# Patient Record
Sex: FEMALE | Race: WHITE | Employment: UNEMPLOYED | URBAN - METROPOLITAN AREA
[De-identification: names, ages, dates, MRNs, and addresses within clinical notes are randomized per-mention and may not be internally consistent; named-entity substitution may affect disease eponyms.]

---

## 2017-03-13 PROCEDURE — 36415 COLL VENOUS BLD VENIPUNCTURE: CPT | Performed by: FAMILY MEDICINE

## 2017-03-13 PROCEDURE — 86803 HEPATITIS C AB TEST: CPT | Performed by: FAMILY MEDICINE

## 2017-03-13 PROCEDURE — 87624 HPV HI-RISK TYP POOLED RSLT: CPT | Performed by: FAMILY MEDICINE

## 2017-03-13 PROCEDURE — 88175 CYTOPATH C/V AUTO FLUID REDO: CPT | Performed by: FAMILY MEDICINE

## 2018-04-02 ENCOUNTER — OFFICE VISIT (OUTPATIENT)
Dept: FAMILY MEDICINE CLINIC | Facility: CLINIC | Age: 63
End: 2018-04-02

## 2018-04-02 VITALS
OXYGEN SATURATION: 98 % | HEART RATE: 82 BPM | WEIGHT: 162 LBS | DIASTOLIC BLOOD PRESSURE: 72 MMHG | SYSTOLIC BLOOD PRESSURE: 128 MMHG | HEIGHT: 63 IN | TEMPERATURE: 99 F | BODY MASS INDEX: 28.7 KG/M2

## 2018-04-02 DIAGNOSIS — J01.40 ACUTE PANSINUSITIS, RECURRENCE NOT SPECIFIED: Primary | ICD-10-CM

## 2018-04-02 DIAGNOSIS — J34.89 NASAL SORE: ICD-10-CM

## 2018-04-02 PROCEDURE — 99213 OFFICE O/P EST LOW 20 MIN: CPT | Performed by: NURSE PRACTITIONER

## 2018-04-02 RX ORDER — BENZONATATE 200 MG/1
200 CAPSULE ORAL 3 TIMES DAILY PRN
Qty: 20 CAPSULE | Refills: 0 | Status: SHIPPED | OUTPATIENT
Start: 2018-04-02 | End: 2020-11-09

## 2018-04-02 RX ORDER — AMOXICILLIN AND CLAVULANATE POTASSIUM 875; 125 MG/1; MG/1
1 TABLET, FILM COATED ORAL 2 TIMES DAILY
Qty: 20 TABLET | Refills: 0 | Status: SHIPPED | OUTPATIENT
Start: 2018-04-02 | End: 2018-04-12

## 2018-04-02 NOTE — PROGRESS NOTES
CHIEF COMPLAINT:   Patient presents with:  Cough: Mucinex--x9 days, sinus congestion, cough, headache, facial pressure      HPI:   Raz Tony is a 63year old female who presents for sinus congestion and cough for  9  days.  Symptoms have been worseni • Heart Surgery Mother      valve repair   • Cancer Paternal Grandmother 80     SKIN CA   • Heart Surgery Brother      Stents   • Heart Disease Brother    • Heart Disorder Brother      MVP repair      Smoking status: Former Smoker ASSESSMENT: Acute pansinusitis, recurrence not specified  (primary encounter diagnosis)  Nasal sore    PLAN: Reports has taken Augmentin before with out problems and this has worked well for her.   Offered steroids as patient cough is very tigh and wheezing · Drink plenty of water, hot tea, and other liquids. This may help thin mucus. It also may promote sinus drainage. · Heat may help soothe painful areas of the face. Use a towel soaked in hot water.  Or,  the shower and direct the hot spray onto you · Unusual drowsiness or confusion  · Swelling of the forehead or eyelids  · Vision problems, including blurred or double vision  · Fever of 100.4ºF (38ºC) or higher, or as directed by your healthcare provider  · Seizure  · Breathing problems  · Symptoms no

## 2021-08-22 ENCOUNTER — OFFICE VISIT (OUTPATIENT)
Dept: FAMILY MEDICINE CLINIC | Facility: CLINIC | Age: 66
End: 2021-08-22
Payer: COMMERCIAL

## 2021-08-22 VITALS
HEIGHT: 63 IN | BODY MASS INDEX: 30.65 KG/M2 | WEIGHT: 173 LBS | TEMPERATURE: 98 F | RESPIRATION RATE: 16 BRPM | DIASTOLIC BLOOD PRESSURE: 72 MMHG | OXYGEN SATURATION: 97 % | SYSTOLIC BLOOD PRESSURE: 122 MMHG | HEART RATE: 72 BPM

## 2021-08-22 DIAGNOSIS — Z20.822 ENCOUNTER FOR LABORATORY TESTING FOR COVID-19 VIRUS: Primary | ICD-10-CM

## 2021-08-22 PROCEDURE — 3074F SYST BP LT 130 MM HG: CPT | Performed by: NURSE PRACTITIONER

## 2021-08-22 PROCEDURE — 3078F DIAST BP <80 MM HG: CPT | Performed by: NURSE PRACTITIONER

## 2021-08-22 PROCEDURE — 99202 OFFICE O/P NEW SF 15 MIN: CPT | Performed by: NURSE PRACTITIONER

## 2021-08-22 PROCEDURE — 3008F BODY MASS INDEX DOCD: CPT | Performed by: NURSE PRACTITIONER

## 2021-08-22 NOTE — PROGRESS NOTES
CHIEF COMPLAINT:   Patient presents with:  Covid: traveling      HPI:   [place] Child is a 63year old female who presents for Covid 19 testing. Pt denies COVID exposure. At this time, not experiencing any COVID symptoms.   Would like to be seen and ha encounter diagnosis)    PLAN:   COVID test sent. Advised pt we will notify her of results. There are no Patient Instructions on file for this visit. The patient indicates understanding of these issues and agrees to the plan.

## 2021-08-24 LAB — SARS-COV-2 RNA RESP QL NAA+PROBE: NOT DETECTED

## 2021-11-22 PROBLEM — N95.0 PMB (POSTMENOPAUSAL BLEEDING): Status: ACTIVE | Noted: 2021-11-18

## 2022-11-07 ENCOUNTER — EXTERNAL LAB (OUTPATIENT)
Dept: PEDIATRIC ENDOCRINOLOGY | Age: 67
End: 2022-11-07

## 2022-11-07 LAB
LAB RESULT: NORMAL
T4 FREE SERPL-MCNC: 1.5 NG/DL (ref 0.8–1.8)
TSH SERPL-ACNC: 0.86 MIU/L (ref 0.4–4.5)

## 2022-11-08 ENCOUNTER — TELEPHONE (OUTPATIENT)
Dept: PEDIATRIC ENDOCRINOLOGY | Age: 67
End: 2022-11-08

## 2022-12-16 ENCOUNTER — LAB REQUISITION (OUTPATIENT)
Age: 67
End: 2022-12-16
Payer: COMMERCIAL

## 2022-12-16 DIAGNOSIS — Z12.11 SPECIAL SCREENING FOR MALIGNANT NEOPLASMS, COLON: ICD-10-CM

## 2022-12-16 PROCEDURE — 88305 TISSUE EXAM BY PATHOLOGIST: CPT | Performed by: INTERNAL MEDICINE

## 2024-08-05 PROBLEM — E55.9 VITAMIN D DEFICIENCY: Status: ACTIVE | Noted: 2023-11-08

## 2024-08-05 PROBLEM — E89.0 POSTOPERATIVE HYPOTHYROIDISM: Status: ACTIVE | Noted: 2022-09-18

## 2024-08-05 PROCEDURE — 88342 IMHCHEM/IMCYTCHM 1ST ANTB: CPT | Performed by: SURGERY

## 2024-08-05 PROCEDURE — 88305 TISSUE EXAM BY PATHOLOGIST: CPT | Performed by: SURGERY

## 2024-08-05 PROCEDURE — 88307 TISSUE EXAM BY PATHOLOGIST: CPT | Performed by: SURGERY

## 2024-08-05 PROCEDURE — 88304 TISSUE EXAM BY PATHOLOGIST: CPT | Performed by: SURGERY
